# Patient Record
Sex: FEMALE | Race: WHITE | NOT HISPANIC OR LATINO | URBAN - METROPOLITAN AREA
[De-identification: names, ages, dates, MRNs, and addresses within clinical notes are randomized per-mention and may not be internally consistent; named-entity substitution may affect disease eponyms.]

---

## 2017-03-02 ENCOUNTER — IMPORTED ENCOUNTER (OUTPATIENT)
Dept: URBAN - METROPOLITAN AREA CLINIC 9 | Facility: CLINIC | Age: 72
End: 2017-03-02

## 2017-03-07 ENCOUNTER — IMPORTED ENCOUNTER (OUTPATIENT)
Dept: URBAN - METROPOLITAN AREA CLINIC 9 | Facility: CLINIC | Age: 72
End: 2017-03-07

## 2017-07-10 ENCOUNTER — IMPORTED ENCOUNTER (OUTPATIENT)
Dept: URBAN - METROPOLITAN AREA CLINIC 9 | Facility: CLINIC | Age: 72
End: 2017-07-10

## 2017-07-24 ENCOUNTER — IMPORTED ENCOUNTER (OUTPATIENT)
Dept: URBAN - METROPOLITAN AREA CLINIC 9 | Facility: CLINIC | Age: 72
End: 2017-07-24

## 2017-08-07 ENCOUNTER — IMPORTED ENCOUNTER (OUTPATIENT)
Dept: URBAN - METROPOLITAN AREA CLINIC 9 | Facility: CLINIC | Age: 72
End: 2017-08-07

## 2017-08-28 ENCOUNTER — IMPORTED ENCOUNTER (OUTPATIENT)
Dept: URBAN - METROPOLITAN AREA CLINIC 9 | Facility: CLINIC | Age: 72
End: 2017-08-28

## 2017-09-26 ENCOUNTER — IMPORTED ENCOUNTER (OUTPATIENT)
Dept: URBAN - METROPOLITAN AREA CLINIC 9 | Facility: CLINIC | Age: 72
End: 2017-09-26

## 2017-10-09 ENCOUNTER — IMPORTED ENCOUNTER (OUTPATIENT)
Dept: URBAN - METROPOLITAN AREA CLINIC 9 | Facility: CLINIC | Age: 72
End: 2017-10-09

## 2017-11-13 ENCOUNTER — IMPORTED ENCOUNTER (OUTPATIENT)
Dept: URBAN - METROPOLITAN AREA CLINIC 9 | Facility: CLINIC | Age: 72
End: 2017-11-13

## 2018-02-12 ENCOUNTER — IMPORTED ENCOUNTER (OUTPATIENT)
Dept: URBAN - METROPOLITAN AREA CLINIC 9 | Facility: CLINIC | Age: 73
End: 2018-02-12

## 2018-06-11 ENCOUNTER — IMPORTED ENCOUNTER (OUTPATIENT)
Dept: URBAN - METROPOLITAN AREA CLINIC 9 | Facility: CLINIC | Age: 73
End: 2018-06-11

## 2018-06-26 ENCOUNTER — IMPORTED ENCOUNTER (OUTPATIENT)
Dept: URBAN - METROPOLITAN AREA CLINIC 9 | Facility: CLINIC | Age: 73
End: 2018-06-26

## 2018-10-29 ENCOUNTER — IMPORTED ENCOUNTER (OUTPATIENT)
Dept: URBAN - METROPOLITAN AREA CLINIC 9 | Facility: CLINIC | Age: 73
End: 2018-10-29

## 2018-11-19 ENCOUNTER — IMPORTED ENCOUNTER (OUTPATIENT)
Dept: URBAN - METROPOLITAN AREA CLINIC 9 | Facility: CLINIC | Age: 73
End: 2018-11-19

## 2018-12-07 ENCOUNTER — IMPORTED ENCOUNTER (OUTPATIENT)
Dept: URBAN - METROPOLITAN AREA CLINIC 9 | Facility: CLINIC | Age: 73
End: 2018-12-07

## 2018-12-10 ENCOUNTER — IMPORTED ENCOUNTER (OUTPATIENT)
Dept: URBAN - METROPOLITAN AREA CLINIC 9 | Facility: CLINIC | Age: 73
End: 2018-12-10

## 2018-12-17 ENCOUNTER — IMPORTED ENCOUNTER (OUTPATIENT)
Dept: URBAN - METROPOLITAN AREA CLINIC 9 | Facility: CLINIC | Age: 73
End: 2018-12-17

## 2018-12-27 ENCOUNTER — IMPORTED ENCOUNTER (OUTPATIENT)
Dept: URBAN - METROPOLITAN AREA CLINIC 9 | Facility: CLINIC | Age: 73
End: 2018-12-27

## 2019-01-14 ENCOUNTER — IMPORTED ENCOUNTER (OUTPATIENT)
Dept: URBAN - METROPOLITAN AREA CLINIC 9 | Facility: CLINIC | Age: 74
End: 2019-01-14

## 2019-02-25 ENCOUNTER — IMPORTED ENCOUNTER (OUTPATIENT)
Dept: URBAN - METROPOLITAN AREA CLINIC 9 | Facility: CLINIC | Age: 74
End: 2019-02-25

## 2019-03-25 ENCOUNTER — IMPORTED ENCOUNTER (OUTPATIENT)
Dept: URBAN - METROPOLITAN AREA CLINIC 9 | Facility: CLINIC | Age: 74
End: 2019-03-25

## 2019-05-27 ENCOUNTER — IMPORTED ENCOUNTER (OUTPATIENT)
Dept: URBAN - METROPOLITAN AREA CLINIC 9 | Facility: CLINIC | Age: 74
End: 2019-05-27

## 2019-08-20 ENCOUNTER — IMPORTED ENCOUNTER (OUTPATIENT)
Dept: URBAN - METROPOLITAN AREA CLINIC 9 | Facility: CLINIC | Age: 74
End: 2019-08-20

## 2019-09-19 ENCOUNTER — IMPORTED ENCOUNTER (OUTPATIENT)
Dept: URBAN - METROPOLITAN AREA CLINIC 9 | Facility: CLINIC | Age: 74
End: 2019-09-19

## 2019-09-23 ENCOUNTER — IMPORTED ENCOUNTER (OUTPATIENT)
Dept: URBAN - METROPOLITAN AREA CLINIC 9 | Facility: CLINIC | Age: 74
End: 2019-09-23

## 2020-03-30 ENCOUNTER — IMPORTED ENCOUNTER (OUTPATIENT)
Dept: URBAN - METROPOLITAN AREA CLINIC 9 | Facility: CLINIC | Age: 75
End: 2020-03-30

## 2020-07-16 ENCOUNTER — IMPORTED ENCOUNTER (OUTPATIENT)
Dept: URBAN - METROPOLITAN AREA CLINIC 9 | Facility: CLINIC | Age: 75
End: 2020-07-16

## 2020-07-20 ENCOUNTER — IMPORTED ENCOUNTER (OUTPATIENT)
Dept: URBAN - METROPOLITAN AREA CLINIC 9 | Facility: CLINIC | Age: 75
End: 2020-07-20

## 2020-08-17 ENCOUNTER — IMPORTED ENCOUNTER (OUTPATIENT)
Dept: URBAN - METROPOLITAN AREA CLINIC 9 | Facility: CLINIC | Age: 75
End: 2020-08-17

## 2020-11-11 ENCOUNTER — IMPORTED ENCOUNTER (OUTPATIENT)
Dept: URBAN - METROPOLITAN AREA CLINIC 9 | Facility: CLINIC | Age: 75
End: 2020-11-11

## 2020-12-28 ENCOUNTER — IMPORTED ENCOUNTER (OUTPATIENT)
Dept: URBAN - METROPOLITAN AREA CLINIC 9 | Facility: CLINIC | Age: 75
End: 2020-12-28

## 2021-01-22 ENCOUNTER — IMPORTED ENCOUNTER (OUTPATIENT)
Dept: URBAN - METROPOLITAN AREA CLINIC 9 | Facility: CLINIC | Age: 76
End: 2021-01-22

## 2021-05-19 ENCOUNTER — IMPORTED ENCOUNTER (OUTPATIENT)
Dept: URBAN - METROPOLITAN AREA CLINIC 9 | Facility: CLINIC | Age: 76
End: 2021-05-19

## 2021-05-24 ENCOUNTER — IMPORTED ENCOUNTER (OUTPATIENT)
Dept: URBAN - METROPOLITAN AREA CLINIC 9 | Facility: CLINIC | Age: 76
End: 2021-05-24

## 2021-09-23 ENCOUNTER — IMPORTED ENCOUNTER (OUTPATIENT)
Dept: URBAN - METROPOLITAN AREA CLINIC 9 | Facility: CLINIC | Age: 76
End: 2021-09-23

## 2021-09-28 ENCOUNTER — IMPORTED ENCOUNTER (OUTPATIENT)
Dept: URBAN - METROPOLITAN AREA CLINIC 9 | Facility: CLINIC | Age: 76
End: 2021-09-28

## 2021-09-28 PROBLEM — H40.1112: Noted: 2021-09-28

## 2021-09-28 PROBLEM — H04.123: Noted: 2021-09-28

## 2021-10-18 ASSESSMENT — VISUAL ACUITY
OD_CC: 20/50 + SN
OD_CC: 20/40 - SN
OD_CC: 20/30 - SN
OD_CC: 20/30 -2 SN
OS_SC: 20/200 SN
OS_CC: 20/40 - SN
OS_CC: 20/40 - SN
OS_CC: 20/30 -2 SN
OS_CC: 20/40 -2 SN
OS_SC: 20/400 SN
OD_CC: 20/40 - SN
OS_CC: 20/40 -2 SN
OD_CC: 20/40 -2 SN
OD_CC: 20/40 SN
OD_CC: 20/50 -2 SN
OS_CC: 20/30 -2 SN
OD_CC: 20/40 - SN
OS_CC: 20/30 - SN
OS_CC: 20/30 - SN
OD_CC: 20/40 - SN
OD_CC: 20/50 + SN
OS_PH: 20/200 SN
OS_CC: 20/30 - SN
OS_CC: 20/30 -2 SN
OS_CC: 20/30 - SN
OD_CC: 20/40 - SN
OD_CC: 20/30 - SN
OS_CC: 20/30 + SN
OD_SC: 20/40 -2 SN
OD_CC: 20/30 - SN
OD_PH: 20/40 - SN
OD_CC: 20/70 + SN
OD_CC: 20/40 + SN
OS_CC: 20/40 + SN
OS_SC: 20/40 - SN
OS_CC: 20/30 -2 SN
OD_CC: 20/30 +2 SN
OS_CC: 20/50 -2 SN
OD_SC: CF 2FT SN
OS_CC: 20/40 -2 SN
OD_CC: 20/30 -2 SN
OD_CC: 20/50 SN
OD_CC: 20/40 - SN
OS_CC: 20/30 -2 SN
OS_CC: 20/40 -2 SN
OS_CC: 20/30 + SN
OS_CC: 20/30 -2 SN
OS_CC: 20/20 SN
OD_CC: 20/50 - SN
OS_CC: 20/30 - SN
OS_CC: 20/30 - SN
OS_PH: 20/80 + SN
OS_PH: 20/70 SN
OD_CC: 20/40 - SN
OS_CC: 20/30 -2 SN
OS_SC: 20/100 + SN
OD_PH: 20/40 - SN
OD_CC: 20/30 -2 SN
OS_CC: 20/50 + SN
OD_CC: 20/25 SN
OD_CC: 20/50 - SN
OS_CC: 20/40 + SN
OD_CC: 20/30 -2 SN
OS_CC: 20/30 - SN
OD_CC: 20/30 -2 SN
OS_SC: 20/200 SN
OD_CC: 20/40 SN
OS_CC: 20/60 SN
OD_CC: 20/40 - SN
OS_CC: 20/30 -2 SN
OS_CC: 20/30 -2 SN
OD_CC: 20/30 -2 SN
OS_CC: 20/25 -2 SN
OS_CC: 20/30 +2 SN
OD_CC: 20/40 - SN
OS_CC: 20/30 -2 SN

## 2021-10-18 ASSESSMENT — TONOMETRY
OD_IOP_MMHG: 16
OS_IOP_MMHG: 16
OD_IOP_MMHG: 15
OS_IOP_MMHG: 15
OS_IOP_MMHG: 12
OS_IOP_MMHG: 12
OS_IOP_MMHG: 18
OS_IOP_MMHG: 9
OD_IOP_MMHG: 15
OD_IOP_MMHG: 16
OD_IOP_MMHG: 15
OS_IOP_MMHG: 26
OS_IOP_MMHG: 16
OS_IOP_MMHG: 16
OD_IOP_MMHG: 14
OS_IOP_MMHG: 11
OD_IOP_MMHG: 14
OS_IOP_MMHG: 16
OD_IOP_MMHG: 9
OS_IOP_MMHG: 14
OS_IOP_MMHG: 16
OS_IOP_MMHG: 14
OS_IOP_MMHG: 14
OS_IOP_MMHG: 13
OD_IOP_MMHG: 15
OS_IOP_MMHG: 12
OD_IOP_MMHG: 12
OD_IOP_MMHG: 14
OD_IOP_MMHG: 11
OD_IOP_MMHG: 14
OS_IOP_MMHG: 15
OS_IOP_MMHG: 17
OD_IOP_MMHG: 13
OS_IOP_MMHG: 14
OD_IOP_MMHG: 18
OD_IOP_MMHG: 16
OS_IOP_MMHG: 17
OS_IOP_MMHG: 15
OD_IOP_MMHG: 13
OS_IOP_MMHG: 15
OD_IOP_MMHG: 13
OD_IOP_MMHG: 19
OD_IOP_MMHG: 14
OS_IOP_MMHG: 13
OD_IOP_MMHG: 13
OD_IOP_MMHG: 15
OS_IOP_MMHG: 14
OD_IOP_MMHG: 14
OD_IOP_MMHG: 19
OD_IOP_MMHG: 14
OS_IOP_MMHG: 14
OD_IOP_MMHG: 16
OS_IOP_MMHG: 36
OS_IOP_MMHG: 14
OD_IOP_MMHG: 14
OS_IOP_MMHG: 15
OS_IOP_MMHG: 13
OS_IOP_MMHG: 11
OS_IOP_MMHG: 13
OD_IOP_MMHG: 17
OD_IOP_MMHG: 12
OD_IOP_MMHG: 16

## 2021-10-18 ASSESSMENT — KERATOMETRY
OD_AXISANGLE_DEGREES: 153
OS_AXISANGLE_DEGREES: 180
OD_AXISANGLE2_DEGREES: 63
OS_AXISANGLE2_DEGREES: 90
OD_K2POWER_DIOPTERS: 47
OS_K1POWER_DIOPTERS: 46
OD_K1POWER_DIOPTERS: 45.5
OS_K2POWER_DIOPTERS: 46

## 2021-10-29 ENCOUNTER — POST OP-NO DILATION (OUTPATIENT)
Dept: URBAN - METROPOLITAN AREA CLINIC 9 | Facility: CLINIC | Age: 76
End: 2021-10-29

## 2021-10-29 DIAGNOSIS — H40.1112: ICD-10-CM

## 2021-10-29 PROCEDURE — 99024 POSTOP FOLLOW-UP VISIT: CPT

## 2021-10-29 ASSESSMENT — TONOMETRY
OS_IOP_MMHG: 14
OD_IOP_MMHG: 14

## 2021-12-03 ENCOUNTER — ESTABLISHED PATIENT (OUTPATIENT)
Dept: URBAN - METROPOLITAN AREA CLINIC 9 | Facility: CLINIC | Age: 76
End: 2021-12-03

## 2021-12-03 DIAGNOSIS — H40.1112: ICD-10-CM

## 2021-12-03 PROCEDURE — 99213 OFFICE O/P EST LOW 20 MIN: CPT

## 2021-12-06 ASSESSMENT — VISUAL ACUITY
OD_CC: 20/50
OS_CC: 20/40+1

## 2021-12-06 ASSESSMENT — TONOMETRY
OS_IOP_MMHG: 13
OD_IOP_MMHG: 13

## 2021-12-08 ENCOUNTER — ESTABLISHED PATIENT (OUTPATIENT)
Dept: URBAN - METROPOLITAN AREA CLINIC 9 | Facility: CLINIC | Age: 76
End: 2021-12-08

## 2021-12-08 DIAGNOSIS — D31.31: ICD-10-CM

## 2021-12-08 DIAGNOSIS — H43.813: ICD-10-CM

## 2021-12-08 DIAGNOSIS — H35.3134: ICD-10-CM

## 2021-12-08 PROCEDURE — 92134 CPTRZ OPH DX IMG PST SGM RTA: CPT

## 2021-12-08 PROCEDURE — 92014 COMPRE OPH EXAM EST PT 1/>: CPT

## 2021-12-08 ASSESSMENT — VISUAL ACUITY
OS_CC: 20/30-1
OD_CC: 20/40-1

## 2021-12-08 ASSESSMENT — TONOMETRY
OD_IOP_MMHG: 14
OS_IOP_MMHG: 13

## 2022-04-08 ENCOUNTER — ESTABLISHED PATIENT (OUTPATIENT)
Dept: URBAN - METROPOLITAN AREA CLINIC 9 | Facility: CLINIC | Age: 77
End: 2022-04-08

## 2022-04-08 DIAGNOSIS — H40.1121: ICD-10-CM

## 2022-04-08 DIAGNOSIS — H40.1112: ICD-10-CM

## 2022-04-08 DIAGNOSIS — D31.31: ICD-10-CM

## 2022-04-08 DIAGNOSIS — H43.813: ICD-10-CM

## 2022-04-08 DIAGNOSIS — H35.3134: ICD-10-CM

## 2022-04-08 PROCEDURE — 99214 OFFICE O/P EST MOD 30 MIN: CPT

## 2022-04-08 PROCEDURE — 92134 CPTRZ OPH DX IMG PST SGM RTA: CPT

## 2022-04-08 ASSESSMENT — TONOMETRY
OD_IOP_MMHG: 14
OS_IOP_MMHG: 15

## 2022-04-08 ASSESSMENT — VISUAL ACUITY
OD_CC: 20/40-2
OS_CC: 20/40-1

## 2022-06-16 NOTE — PATIENT DISCUSSION
PATIENT INSTRUCTED TO RETURN TO PCP FOR CONTINUED BLOOD  SUGAR MONITORING AND RETURN FOR FOLLOW-UP AS SCHEDULED FOR DILATED EXAM.

## 2022-10-24 ENCOUNTER — TECH ONLY (OUTPATIENT)
Dept: URBAN - METROPOLITAN AREA CLINIC 9 | Facility: CLINIC | Age: 77
End: 2022-10-24

## 2022-10-24 DIAGNOSIS — H40.1121: ICD-10-CM

## 2022-10-24 DIAGNOSIS — H40.1112: ICD-10-CM

## 2022-10-24 PROCEDURE — 92083 EXTENDED VISUAL FIELD XM: CPT

## 2022-11-14 ENCOUNTER — ESTABLISHED PATIENT (OUTPATIENT)
Dept: URBAN - METROPOLITAN AREA CLINIC 9 | Facility: CLINIC | Age: 77
End: 2022-11-14

## 2022-11-14 DIAGNOSIS — H40.1121: ICD-10-CM

## 2022-11-14 DIAGNOSIS — H43.813: ICD-10-CM

## 2022-11-14 DIAGNOSIS — H35.3134: ICD-10-CM

## 2022-11-14 DIAGNOSIS — D31.31: ICD-10-CM

## 2022-11-14 DIAGNOSIS — H40.1112: ICD-10-CM

## 2022-11-14 PROCEDURE — 92133 CPTRZD OPH DX IMG PST SGM ON: CPT

## 2022-11-14 PROCEDURE — 99214 OFFICE O/P EST MOD 30 MIN: CPT

## 2022-11-14 PROCEDURE — 92250 FUNDUS PHOTOGRAPHY W/I&R: CPT

## 2022-11-14 PROCEDURE — 92015 DETERMINE REFRACTIVE STATE: CPT

## 2022-11-14 ASSESSMENT — TONOMETRY
OS_IOP_MMHG: 12
OD_IOP_MMHG: 14

## 2022-11-14 ASSESSMENT — VISUAL ACUITY
OD_CC: 20/50-1
OS_GLARE: 20/400
OS_CC: 20/30+1

## 2023-01-18 ENCOUNTER — ESTABLISHED PATIENT (OUTPATIENT)
Dept: URBAN - METROPOLITAN AREA CLINIC 9 | Facility: CLINIC | Age: 78
End: 2023-01-18

## 2023-01-18 DIAGNOSIS — H43.813: ICD-10-CM

## 2023-01-18 DIAGNOSIS — H35.3133: ICD-10-CM

## 2023-01-18 PROCEDURE — 92134 CPTRZ OPH DX IMG PST SGM RTA: CPT

## 2023-01-18 PROCEDURE — 92014 COMPRE OPH EXAM EST PT 1/>: CPT

## 2023-01-18 ASSESSMENT — VISUAL ACUITY
OS_CC: 20/30-2
OD_CC: 20/50

## 2023-01-18 ASSESSMENT — TONOMETRY
OS_IOP_MMHG: 16
OD_IOP_MMHG: 18

## 2023-05-08 ENCOUNTER — ESTABLISHED PATIENT (OUTPATIENT)
Dept: URBAN - METROPOLITAN AREA CLINIC 9 | Facility: CLINIC | Age: 78
End: 2023-05-08

## 2023-05-08 DIAGNOSIS — H43.813: ICD-10-CM

## 2023-05-08 DIAGNOSIS — H35.3133: ICD-10-CM

## 2023-05-08 DIAGNOSIS — H40.1121: ICD-10-CM

## 2023-05-08 DIAGNOSIS — H40.1112: ICD-10-CM

## 2023-05-08 PROCEDURE — 92134 CPTRZ OPH DX IMG PST SGM RTA: CPT

## 2023-05-08 PROCEDURE — 99214 OFFICE O/P EST MOD 30 MIN: CPT

## 2023-05-08 ASSESSMENT — VISUAL ACUITY
OS_CC: 20/40+1
OU_CC: 20/40+1
OD_CC: 20/50

## 2023-05-08 ASSESSMENT — TONOMETRY
OD_IOP_MMHG: 16
OS_IOP_MMHG: 12

## 2023-08-04 ENCOUNTER — CLINIC PROCEDURE ONLY (OUTPATIENT)
Dept: URBAN - METROPOLITAN AREA CLINIC 9 | Facility: CLINIC | Age: 78
End: 2023-08-04

## 2023-08-04 DIAGNOSIS — H40.1112: ICD-10-CM

## 2023-08-04 PROCEDURE — 65855 TRABECULOPLASTY LASER SURG: CPT

## 2023-08-04 ASSESSMENT — TONOMETRY
OS_IOP_MMHG: 15
OD_IOP_MMHG: 16

## 2023-08-04 ASSESSMENT — VISUAL ACUITY
OS_CC: 20/40+2
OD_CC: 20/70

## 2023-08-29 ENCOUNTER — ESTABLISHED PATIENT (OUTPATIENT)
Dept: URBAN - METROPOLITAN AREA CLINIC 9 | Facility: CLINIC | Age: 78
End: 2023-08-29

## 2023-08-29 DIAGNOSIS — H40.1112: ICD-10-CM

## 2023-08-29 DIAGNOSIS — H35.3133: ICD-10-CM

## 2023-08-29 DIAGNOSIS — H40.1121: ICD-10-CM

## 2023-08-29 PROCEDURE — 99213 OFFICE O/P EST LOW 20 MIN: CPT

## 2023-08-29 ASSESSMENT — VISUAL ACUITY
OD_CC: 20/80
OS_CC: 20/70

## 2023-08-29 ASSESSMENT — TONOMETRY
OD_IOP_MMHG: 14
OS_IOP_MMHG: 12

## 2023-10-30 ENCOUNTER — ESTABLISHED PATIENT (OUTPATIENT)
Facility: LOCATION | Age: 78
End: 2023-10-30

## 2023-10-30 DIAGNOSIS — H04.123: ICD-10-CM

## 2023-10-30 DIAGNOSIS — H40.1112: ICD-10-CM

## 2023-10-30 DIAGNOSIS — H40.1121: ICD-10-CM

## 2023-10-30 PROCEDURE — 99213 OFFICE O/P EST LOW 20 MIN: CPT

## 2023-10-30 ASSESSMENT — TONOMETRY
OD_IOP_MMHG: 15
OS_IOP_MMHG: 14

## 2023-10-30 ASSESSMENT — VISUAL ACUITY
OS_CC: 20/70+1
OU_CC: 20/70+1
OD_CC: 20/100-1

## 2024-01-08 ENCOUNTER — ESTABLISHED PATIENT (OUTPATIENT)
Facility: LOCATION | Age: 79
End: 2024-01-08

## 2024-01-08 DIAGNOSIS — H43.813: ICD-10-CM

## 2024-01-08 DIAGNOSIS — H18.592: ICD-10-CM

## 2024-01-08 DIAGNOSIS — D31.31: ICD-10-CM

## 2024-01-08 DIAGNOSIS — H40.1121: ICD-10-CM

## 2024-01-08 DIAGNOSIS — H04.123: ICD-10-CM

## 2024-01-08 DIAGNOSIS — H35.3211: ICD-10-CM

## 2024-01-08 DIAGNOSIS — H35.3123: ICD-10-CM

## 2024-01-08 DIAGNOSIS — H40.1112: ICD-10-CM

## 2024-01-08 PROCEDURE — 99214 OFFICE O/P EST MOD 30 MIN: CPT

## 2024-01-08 ASSESSMENT — TONOMETRY
OS_IOP_MMHG: 17
OD_IOP_MMHG: 15

## 2024-01-08 ASSESSMENT — VISUAL ACUITY
OS_CC: 20/70+1
OU_CC: 20/70+1
OD_CC: 20/100

## 2024-01-10 ENCOUNTER — ESTABLISHED PATIENT (OUTPATIENT)
Facility: LOCATION | Age: 79
End: 2024-01-10

## 2024-01-10 DIAGNOSIS — H35.3114: ICD-10-CM

## 2024-01-10 DIAGNOSIS — H43.813: ICD-10-CM

## 2024-01-10 DIAGNOSIS — H35.3123: ICD-10-CM

## 2024-01-10 PROCEDURE — 92250 FUNDUS PHOTOGRAPHY W/I&R: CPT

## 2024-01-10 PROCEDURE — 99214 OFFICE O/P EST MOD 30 MIN: CPT

## 2024-01-10 ASSESSMENT — TONOMETRY
OS_IOP_MMHG: 15
OD_IOP_MMHG: 19

## 2024-01-10 ASSESSMENT — VISUAL ACUITY
OS_CC: 20/50+1
OD_CC: 20/200

## 2024-02-07 ENCOUNTER — ESTABLISHED PATIENT (OUTPATIENT)
Facility: LOCATION | Age: 79
End: 2024-02-07

## 2024-02-07 DIAGNOSIS — H35.3123: ICD-10-CM

## 2024-02-07 DIAGNOSIS — H35.3114: ICD-10-CM

## 2024-02-07 PROCEDURE — 67028 INJECTION EYE DRUG: CPT

## 2024-02-07 PROCEDURE — 92134 CPTRZ OPH DX IMG PST SGM RTA: CPT

## 2024-02-07 ASSESSMENT — TONOMETRY
OD_IOP_MMHG: 15
OS_IOP_MMHG: 11

## 2024-02-07 ASSESSMENT — VISUAL ACUITY
OD_CC: 20/200
OS_CC: 20/50+1

## 2024-04-03 ENCOUNTER — ESTABLISHED PATIENT (OUTPATIENT)
Facility: LOCATION | Age: 79
End: 2024-04-03

## 2024-04-03 DIAGNOSIS — H35.3114: ICD-10-CM

## 2024-04-03 DIAGNOSIS — H43.813: ICD-10-CM

## 2024-04-03 DIAGNOSIS — H35.3123: ICD-10-CM

## 2024-04-03 DIAGNOSIS — H04.123: ICD-10-CM

## 2024-04-03 PROCEDURE — 92134 CPTRZ OPH DX IMG PST SGM RTA: CPT

## 2024-04-03 PROCEDURE — 99214 OFFICE O/P EST MOD 30 MIN: CPT | Mod: 25

## 2024-04-03 PROCEDURE — 67028 INJECTION EYE DRUG: CPT

## 2024-04-03 ASSESSMENT — VISUAL ACUITY
OD_CC: 20/200
OS_CC: 20/50+2

## 2024-04-03 ASSESSMENT — TONOMETRY
OS_IOP_MMHG: 18
OD_IOP_MMHG: 17

## 2024-04-25 ENCOUNTER — TECH ONLY (OUTPATIENT)
Facility: LOCATION | Age: 79
End: 2024-04-25

## 2024-04-25 DIAGNOSIS — H40.1112: ICD-10-CM

## 2024-04-25 DIAGNOSIS — H40.1121: ICD-10-CM

## 2024-04-25 PROCEDURE — 92083 EXTENDED VISUAL FIELD XM: CPT

## 2024-04-29 ENCOUNTER — ESTABLISHED PATIENT (OUTPATIENT)
Facility: LOCATION | Age: 79
End: 2024-04-29

## 2024-04-29 DIAGNOSIS — H04.123: ICD-10-CM

## 2024-04-29 DIAGNOSIS — H43.813: ICD-10-CM

## 2024-04-29 DIAGNOSIS — H35.3133: ICD-10-CM

## 2024-04-29 DIAGNOSIS — H40.1121: ICD-10-CM

## 2024-04-29 DIAGNOSIS — H18.592: ICD-10-CM

## 2024-04-29 DIAGNOSIS — H40.1112: ICD-10-CM

## 2024-04-29 DIAGNOSIS — D31.31: ICD-10-CM

## 2024-04-29 PROCEDURE — 92015 DETERMINE REFRACTIVE STATE: CPT

## 2024-04-29 PROCEDURE — 92020 GONIOSCOPY: CPT

## 2024-04-29 PROCEDURE — 99214 OFFICE O/P EST MOD 30 MIN: CPT

## 2024-04-29 PROCEDURE — 92134 CPTRZ OPH DX IMG PST SGM RTA: CPT

## 2024-04-29 ASSESSMENT — TONOMETRY
OS_IOP_MMHG: 16
OD_IOP_MMHG: 17

## 2024-04-29 ASSESSMENT — VISUAL ACUITY
OS_SC: 20/200
OD_SC: 20/400
OD_CC: 20/200
OU_CC: 20/40-2
OU_SC: 20/200
OS_CC: 20/40-2

## 2024-05-29 ENCOUNTER — ESTABLISHED PATIENT (OUTPATIENT)
Facility: LOCATION | Age: 79
End: 2024-05-29

## 2024-05-29 DIAGNOSIS — H35.3114: ICD-10-CM

## 2024-05-29 DIAGNOSIS — H35.3123: ICD-10-CM

## 2024-05-29 PROCEDURE — 67028 INJECTION EYE DRUG: CPT

## 2024-05-29 PROCEDURE — 92134 CPTRZ OPH DX IMG PST SGM RTA: CPT

## 2024-05-29 ASSESSMENT — TONOMETRY
OS_IOP_MMHG: 15
OD_IOP_MMHG: 12

## 2024-05-29 ASSESSMENT — VISUAL ACUITY
OS_CC: 20/30-2
OD_CC: 20/400

## 2024-07-24 ENCOUNTER — COMPREHENSIVE EXAM (OUTPATIENT)
Facility: LOCATION | Age: 79
End: 2024-07-24

## 2024-07-24 DIAGNOSIS — H35.3123: ICD-10-CM

## 2024-07-24 DIAGNOSIS — H04.123: ICD-10-CM

## 2024-07-24 DIAGNOSIS — H43.813: ICD-10-CM

## 2024-07-24 DIAGNOSIS — H35.3114: ICD-10-CM

## 2024-07-24 PROCEDURE — 92134 CPTRZ OPH DX IMG PST SGM RTA: CPT

## 2024-07-24 PROCEDURE — 67028 INJECTION EYE DRUG: CPT

## 2024-07-24 PROCEDURE — 99214 OFFICE O/P EST MOD 30 MIN: CPT | Mod: 25

## 2024-07-24 ASSESSMENT — VISUAL ACUITY
OD_CC: CF 1FT
OS_CC: 20/40-1

## 2024-07-24 ASSESSMENT — TONOMETRY
OS_IOP_MMHG: 14
OD_IOP_MMHG: 12

## 2024-09-10 ENCOUNTER — COMPREHENSIVE EXAM (OUTPATIENT)
Facility: LOCATION | Age: 79
End: 2024-09-10

## 2024-09-10 DIAGNOSIS — H40.1112: ICD-10-CM

## 2024-09-10 DIAGNOSIS — H40.1121: ICD-10-CM

## 2024-09-10 PROCEDURE — 99213 OFFICE O/P EST LOW 20 MIN: CPT

## 2024-09-10 PROCEDURE — 92133 CPTRZD OPH DX IMG PST SGM ON: CPT

## 2024-09-18 ENCOUNTER — COMPREHENSIVE EXAM (OUTPATIENT)
Facility: LOCATION | Age: 79
End: 2024-09-18

## 2024-09-18 DIAGNOSIS — H35.3114: ICD-10-CM

## 2024-09-18 DIAGNOSIS — H35.3123: ICD-10-CM

## 2024-09-18 PROCEDURE — 67028 INJECTION EYE DRUG: CPT | Mod: 50

## 2024-09-18 PROCEDURE — 92134 CPTRZ OPH DX IMG PST SGM RTA: CPT

## 2024-11-13 ENCOUNTER — COMPREHENSIVE EXAM (OUTPATIENT)
Dept: URBAN - METROPOLITAN AREA CLINIC 11 | Facility: CLINIC | Age: 79
End: 2024-11-13

## 2024-11-13 DIAGNOSIS — H43.813: ICD-10-CM

## 2024-11-13 DIAGNOSIS — H01.021: ICD-10-CM

## 2024-11-13 DIAGNOSIS — H01.024: ICD-10-CM

## 2024-11-13 DIAGNOSIS — H35.3114: ICD-10-CM

## 2024-11-13 DIAGNOSIS — H35.433: ICD-10-CM

## 2024-11-13 DIAGNOSIS — H04.123: ICD-10-CM

## 2024-11-13 DIAGNOSIS — H35.3123: ICD-10-CM

## 2024-11-13 PROCEDURE — 99214 OFFICE O/P EST MOD 30 MIN: CPT | Mod: 25

## 2024-11-13 PROCEDURE — 92134 CPTRZ OPH DX IMG PST SGM RTA: CPT

## 2024-11-13 PROCEDURE — 67028 INJECTION EYE DRUG: CPT | Mod: 50

## 2025-01-08 ENCOUNTER — COMPREHENSIVE EXAM (OUTPATIENT)
Age: 80
End: 2025-01-08

## 2025-01-08 DIAGNOSIS — H35.3123: ICD-10-CM

## 2025-01-08 DIAGNOSIS — H35.3114: ICD-10-CM

## 2025-01-08 PROCEDURE — 67028 INJECTION EYE DRUG: CPT | Mod: 50

## 2025-01-08 PROCEDURE — 92134 CPTRZ OPH DX IMG PST SGM RTA: CPT

## 2025-03-05 ENCOUNTER — COMPREHENSIVE EXAM (OUTPATIENT)
Age: 80
End: 2025-03-05

## 2025-03-05 DIAGNOSIS — H04.123: ICD-10-CM

## 2025-03-05 DIAGNOSIS — H43.813: ICD-10-CM

## 2025-03-05 DIAGNOSIS — H40.1112: ICD-10-CM

## 2025-03-05 DIAGNOSIS — H40.1121: ICD-10-CM

## 2025-03-05 DIAGNOSIS — H01.021: ICD-10-CM

## 2025-03-05 DIAGNOSIS — H35.3123: ICD-10-CM

## 2025-03-05 DIAGNOSIS — H01.024: ICD-10-CM

## 2025-03-05 DIAGNOSIS — H35.3114: ICD-10-CM

## 2025-03-05 PROCEDURE — 99214 OFFICE O/P EST MOD 30 MIN: CPT | Mod: 25

## 2025-03-05 PROCEDURE — 92134 CPTRZ OPH DX IMG PST SGM RTA: CPT

## 2025-03-05 PROCEDURE — 67028 INJECTION EYE DRUG: CPT | Mod: 50

## 2025-04-17 ENCOUNTER — TECH ONLY (OUTPATIENT)
Age: 80
End: 2025-04-17

## 2025-04-17 DIAGNOSIS — H40.1121: ICD-10-CM

## 2025-04-17 DIAGNOSIS — H40.1112: ICD-10-CM

## 2025-04-17 PROCEDURE — 92083 EXTENDED VISUAL FIELD XM: CPT

## 2025-04-22 ENCOUNTER — COMPREHENSIVE EXAM (OUTPATIENT)
Age: 80
End: 2025-04-22

## 2025-04-22 DIAGNOSIS — H43.813: ICD-10-CM

## 2025-04-22 DIAGNOSIS — H04.123: ICD-10-CM

## 2025-04-22 DIAGNOSIS — D31.31: ICD-10-CM

## 2025-04-22 DIAGNOSIS — H40.1121: ICD-10-CM

## 2025-04-22 DIAGNOSIS — H35.3133: ICD-10-CM

## 2025-04-22 DIAGNOSIS — H18.592: ICD-10-CM

## 2025-04-22 DIAGNOSIS — H40.1112: ICD-10-CM

## 2025-04-22 PROCEDURE — 99214 OFFICE O/P EST MOD 30 MIN: CPT

## 2025-04-22 PROCEDURE — 92015 DETERMINE REFRACTIVE STATE: CPT

## 2025-04-22 PROCEDURE — 92134 CPTRZ OPH DX IMG PST SGM RTA: CPT

## 2025-04-30 ENCOUNTER — CLINIC PROCEDURE ONLY (OUTPATIENT)
Age: 80
End: 2025-04-30

## 2025-04-30 DIAGNOSIS — H35.3123: ICD-10-CM

## 2025-04-30 DIAGNOSIS — H35.3114: ICD-10-CM

## 2025-04-30 PROCEDURE — 92134 CPTRZ OPH DX IMG PST SGM RTA: CPT

## 2025-04-30 PROCEDURE — 67028 INJECTION EYE DRUG: CPT | Mod: 50

## 2025-06-25 ENCOUNTER — COMPREHENSIVE EXAM (OUTPATIENT)
Age: 80
End: 2025-06-25

## 2025-06-25 DIAGNOSIS — H01.021: ICD-10-CM

## 2025-06-25 DIAGNOSIS — H35.3114: ICD-10-CM

## 2025-06-25 DIAGNOSIS — H40.1112: ICD-10-CM

## 2025-06-25 DIAGNOSIS — H43.813: ICD-10-CM

## 2025-06-25 DIAGNOSIS — H40.1121: ICD-10-CM

## 2025-06-25 DIAGNOSIS — H35.3123: ICD-10-CM

## 2025-06-25 DIAGNOSIS — H04.123: ICD-10-CM

## 2025-06-25 DIAGNOSIS — H01.024: ICD-10-CM

## 2025-06-25 PROCEDURE — 67028 INJECTION EYE DRUG: CPT | Mod: 50

## 2025-06-25 PROCEDURE — 99214 OFFICE O/P EST MOD 30 MIN: CPT | Mod: 25

## 2025-06-25 PROCEDURE — 92134 CPTRZ OPH DX IMG PST SGM RTA: CPT

## 2025-08-20 ENCOUNTER — CLINIC PROCEDURE ONLY (OUTPATIENT)
Age: 80
End: 2025-08-20

## 2025-08-20 DIAGNOSIS — H35.3123: ICD-10-CM

## 2025-08-20 DIAGNOSIS — H35.3114: ICD-10-CM

## 2025-08-20 PROCEDURE — 67028 INJECTION EYE DRUG: CPT

## 2025-08-20 PROCEDURE — 92134 CPTRZ OPH DX IMG PST SGM RTA: CPT
